# Patient Record
Sex: FEMALE | NOT HISPANIC OR LATINO | Employment: FULL TIME | URBAN - METROPOLITAN AREA
[De-identification: names, ages, dates, MRNs, and addresses within clinical notes are randomized per-mention and may not be internally consistent; named-entity substitution may affect disease eponyms.]

---

## 2018-12-19 ENCOUNTER — APPOINTMENT (OUTPATIENT)
Dept: CT IMAGING | Facility: HOSPITAL | Age: 43
End: 2018-12-19

## 2018-12-19 ENCOUNTER — HOSPITAL ENCOUNTER (EMERGENCY)
Facility: HOSPITAL | Age: 43
Discharge: HOME | End: 2018-12-20
Attending: EMERGENCY MEDICINE | Admitting: EMERGENCY MEDICINE

## 2018-12-19 DIAGNOSIS — S09.90XA MINOR HEAD INJURY, INITIAL ENCOUNTER: ICD-10-CM

## 2018-12-19 DIAGNOSIS — S01.81XA LACERATION OF FOREHEAD, INITIAL ENCOUNTER: Primary | ICD-10-CM

## 2018-12-19 DIAGNOSIS — S01.01XA LACERATION OF SCALP, INITIAL ENCOUNTER: ICD-10-CM

## 2018-12-19 DIAGNOSIS — E87.6 HYPOKALEMIA: ICD-10-CM

## 2018-12-19 LAB
ANION GAP SERPL CALCULATED.3IONS-SCNC: 7 MMOL/L (ref 3–11)
B-HCG UR QL: NEGATIVE
BASOPHILS # BLD AUTO: 0.03 10*3/MM3 (ref 0–0.2)
BASOPHILS NFR BLD AUTO: 0.2 % (ref 0–1)
BUN BLD-MCNC: 9 MG/DL (ref 9–23)
BUN/CREAT SERPL: 11.8 (ref 7–25)
CALCIUM SPEC-SCNC: 9.1 MG/DL (ref 8.7–10.4)
CHLORIDE SERPL-SCNC: 106 MMOL/L (ref 99–109)
CO2 SERPL-SCNC: 26 MMOL/L (ref 20–31)
CREAT BLD-MCNC: 0.76 MG/DL (ref 0.6–1.3)
DEPRECATED RDW RBC AUTO: 41.9 FL (ref 37–54)
EOSINOPHIL # BLD AUTO: 0.33 10*3/MM3 (ref 0–0.3)
EOSINOPHIL NFR BLD AUTO: 2.5 % (ref 0–3)
ERYTHROCYTE [DISTWIDTH] IN BLOOD BY AUTOMATED COUNT: 12.5 % (ref 11.3–14.5)
GFR SERPL CREATININE-BSD FRML MDRD: 101 ML/MIN/1.73
GFR SERPL CREATININE-BSD FRML MDRD: 83 ML/MIN/1.73
GLUCOSE BLD-MCNC: 114 MG/DL (ref 70–100)
HCT VFR BLD AUTO: 38.8 % (ref 34.5–44)
HGB BLD-MCNC: 13.2 G/DL (ref 11.5–15.5)
IMM GRANULOCYTES # BLD: 0.05 10*3/MM3 (ref 0–0.03)
IMM GRANULOCYTES NFR BLD: 0.4 % (ref 0–0.6)
LYMPHOCYTES # BLD AUTO: 2.94 10*3/MM3 (ref 0.6–4.8)
LYMPHOCYTES NFR BLD AUTO: 22.4 % (ref 24–44)
MCH RBC QN AUTO: 31.1 PG (ref 27–31)
MCHC RBC AUTO-ENTMCNC: 34 G/DL (ref 32–36)
MCV RBC AUTO: 91.5 FL (ref 80–99)
MONOCYTES # BLD AUTO: 0.98 10*3/MM3 (ref 0–1)
MONOCYTES NFR BLD AUTO: 7.5 % (ref 0–12)
NEUTROPHILS # BLD AUTO: 8.79 10*3/MM3 (ref 1.5–8.3)
NEUTROPHILS NFR BLD AUTO: 67 % (ref 41–71)
PLATELET # BLD AUTO: 271 10*3/MM3 (ref 150–450)
PMV BLD AUTO: 11.5 FL (ref 6–12)
POTASSIUM BLD-SCNC: 3.4 MMOL/L (ref 3.5–5.5)
RBC # BLD AUTO: 4.24 10*6/MM3 (ref 3.89–5.14)
SODIUM BLD-SCNC: 139 MMOL/L (ref 132–146)
WBC NRBC COR # BLD: 13.12 10*3/MM3 (ref 3.5–10.8)

## 2018-12-19 PROCEDURE — 99284 EMERGENCY DEPT VISIT MOD MDM: CPT

## 2018-12-19 PROCEDURE — 93005 ELECTROCARDIOGRAM TRACING: CPT | Performed by: EMERGENCY MEDICINE

## 2018-12-19 PROCEDURE — 85025 COMPLETE CBC W/AUTO DIFF WBC: CPT | Performed by: EMERGENCY MEDICINE

## 2018-12-19 PROCEDURE — 81025 URINE PREGNANCY TEST: CPT | Performed by: EMERGENCY MEDICINE

## 2018-12-19 PROCEDURE — 80048 BASIC METABOLIC PNL TOTAL CA: CPT | Performed by: EMERGENCY MEDICINE

## 2018-12-19 PROCEDURE — 70450 CT HEAD/BRAIN W/O DYE: CPT

## 2018-12-19 RX ORDER — LIDOCAINE HYDROCHLORIDE AND EPINEPHRINE 10; 10 MG/ML; UG/ML
INJECTION, SOLUTION INFILTRATION; PERINEURAL
Status: COMPLETED
Start: 2018-12-19 | End: 2018-12-20

## 2018-12-19 RX ORDER — POTASSIUM CHLORIDE 750 MG/1
20 CAPSULE, EXTENDED RELEASE ORAL ONCE
Status: COMPLETED | OUTPATIENT
Start: 2018-12-19 | End: 2018-12-20

## 2018-12-19 RX ORDER — IBUPROFEN 200 MG
200 TABLET ORAL EVERY 6 HOURS PRN
COMMUNITY

## 2018-12-20 VITALS
RESPIRATION RATE: 16 BRPM | DIASTOLIC BLOOD PRESSURE: 74 MMHG | TEMPERATURE: 98.5 F | HEART RATE: 90 BPM | BODY MASS INDEX: 24.75 KG/M2 | SYSTOLIC BLOOD PRESSURE: 110 MMHG | HEIGHT: 64 IN | OXYGEN SATURATION: 98 % | WEIGHT: 145 LBS

## 2018-12-20 PROCEDURE — 25010000002 TDAP 5-2.5-18.5 LF-MCG/0.5 SUSPENSION: Performed by: EMERGENCY MEDICINE

## 2018-12-20 PROCEDURE — 90715 TDAP VACCINE 7 YRS/> IM: CPT | Performed by: EMERGENCY MEDICINE

## 2018-12-20 PROCEDURE — 90471 IMMUNIZATION ADMIN: CPT | Performed by: EMERGENCY MEDICINE

## 2018-12-20 RX ORDER — LIDOCAINE HYDROCHLORIDE AND EPINEPHRINE 10; 10 MG/ML; UG/ML
10 INJECTION, SOLUTION INFILTRATION; PERINEURAL ONCE
Status: COMPLETED | OUTPATIENT
Start: 2018-12-20 | End: 2018-12-20

## 2018-12-20 RX ADMIN — POTASSIUM CHLORIDE 20 MEQ: 750 CAPSULE, EXTENDED RELEASE ORAL at 00:08

## 2018-12-20 RX ADMIN — TETANUS TOXOID, REDUCED DIPHTHERIA TOXOID AND ACELLULAR PERTUSSIS VACCINE, ADSORBED 0.5 ML: 5; 2.5; 8; 8; 2.5 SUSPENSION INTRAMUSCULAR at 00:06

## 2018-12-20 RX ADMIN — LIDOCAINE HYDROCHLORIDE AND EPINEPHRINE 5 ML: 10; 10 INJECTION, SOLUTION INFILTRATION; PERINEURAL at 00:05

## 2018-12-20 RX ADMIN — LIDOCAINE HYDROCHLORIDE,EPINEPHRINE BITARTRATE 5 ML: 10; .01 INJECTION, SOLUTION INFILTRATION; PERINEURAL at 00:05

## 2018-12-20 NOTE — ED PROVIDER NOTES
Subjective   43-year-old female presents for evaluation of head injury.  She states that approximately 30 minutes ago she was in her kitchen when she got dizzy, tripped, fell, and hit her head on the corner of a counter.  No LOC.  She is not anticoagulated.  Her only complaint at this time is a mild headache as well as a laceration to her forehead and scalp.  Unknown tetanus status.  No neck pain.        History provided by:  Patient  Fall   Mechanism of injury: fall    Injury location:  Head/neck  Head/neck injury location:  Head  Incident location:  Home  Time since incident:  30 minutes  Arrived directly from scene: yes    Fall:     Fall occurred:  Walking and tripped    Impact surface:  Hard floor    Point of impact:  Head    Entrapped after fall: no    Suspicion of alcohol use: no    Suspicion of drug use: no    Tetanus status:  Out of date  Prior to arrival data:     Patient ambulatory at scene: yes      Blood loss:  Minimal    Responsiveness at scene:  Alert    Orientation at scene:  Person, place, situation and time    Loss of consciousness: no      Amnesic to event: no    Associated symptoms: headaches    Associated symptoms: no loss of consciousness and no neck pain        Review of Systems   Musculoskeletal: Negative for neck pain.   Neurological: Positive for dizziness and headaches. Negative for loss of consciousness.   All other systems reviewed and are negative.      History reviewed. No pertinent past medical history.    No Known Allergies    Past Surgical History:   Procedure Laterality Date   •  SECTION      X 3       History reviewed. No pertinent family history.    Social History     Socioeconomic History   • Marital status:      Spouse name: Not on file   • Number of children: Not on file   • Years of education: Not on file   • Highest education level: Not on file   Tobacco Use   • Smoking status: Never Smoker   • Smokeless tobacco: Never Used   Substance and Sexual Activity   •  Alcohol use: No     Frequency: Never   • Drug use: No   • Sexual activity: Defer         Objective   Physical Exam   Constitutional: She is oriented to person, place, and time. She appears well-developed and well-nourished. No distress.   Well-appearing female in no acute distress   HENT:   Head: Normocephalic and atraumatic.   Mouth/Throat: Oropharynx is clear and moist.   Eyes: EOM are normal. Pupils are equal, round, and reactive to light.   Neck: Normal range of motion.   No midline cervical spine tenderness to palpation   Cardiovascular: Normal rate, regular rhythm and normal heart sounds. Exam reveals no gallop and no friction rub.   No murmur heard.  Pulmonary/Chest: Effort normal and breath sounds normal. No respiratory distress. She has no wheezes. She has no rales.   Musculoskeletal: Normal range of motion.   Neurological: She is alert and oriented to person, place, and time. No cranial nerve deficit or sensory deficit. Coordination normal.   Normal gait, 5 out of 5 strength in all fours, neurovascularly intact distally in all fours with bounding distal pulses and normal sensation   Skin: She is not diaphoretic.   Approximately 3x1 cm V-shaped laceration noted to superior and forehead extending into scalp/hairline, no active bleeding   Psychiatric: She has a normal mood and affect. Judgment and thought content normal.   Nursing note and vitals reviewed.      Laceration Repair  Date/Time: 12/19/2018 11:49 PM  Performed by: Ben Rodney MD  Authorized by: Ben Rodney MD     Consent:     Consent obtained:  Written    Consent given by:  Patient    Risks discussed:  Infection and pain  Anesthesia (see MAR for exact dosages):     Anesthesia method:  Local infiltration    Local anesthetic:  Lidocaine 1% WITH epi  Laceration details:     Location:  Scalp    Scalp location:  Frontal    Wound length (cm): 3 by 1 cm v shaped laceration.  Repair type:     Repair type:  Simple  Pre-procedure details:      Preparation:  Patient was prepped and draped in usual sterile fashion and imaging obtained to evaluate for foreign bodies  Treatment:     Area cleansed with:  Saline    Amount of cleaning:  Standard  Skin repair:     Repair method:  Sutures    Suture size:  6-0 (and 4-0)    Suture material:  Nylon    Suture technique:  Simple interrupted    Number of sutures: 6 of 6-0 and 3 of 4-0.  Approximation:     Approximation:  Close  Post-procedure details:     Dressing:  Open (no dressing)    Patient tolerance of procedure:  Tolerated well, no immediate complications               ED Course  ED Course as of Dec 20 0228   Wed Dec 19, 2018   2249 43-year-old female presents for evaluation following head injury.  Approximately 30 minutes ago, the patient was in her kitchen when she got dizzy, tripped, fell, and hit her head on a corner.  On arrival to the ED, patient well-appearing.  No LOC.  NEXUS negative.  No anticoagulants.  Currently, the patient's only complaint is mild headache in addition to her laceration.  Tetanus updated.  LET applied.  No neurological deficits noted.  We will obtain labs, EKG, and neuro imaging and we will reassess following initial interventions.  [DD]   2336 EKG revealed normal sinus rhythm with heart rate of 91 and no ST segments suggestive of or concerning for ischemia.  [DD]   2343 Labs remarkable only for mild hypokalemia.  Potassium replaced orally in the ED.  [DD]   u Dec 20, 2018   0012 After copious irrigation, the patient's wounds were repaired without complication.  Clean wound--no antibiotics indicated.  She will follow-up in the emergency department in 5 days for suture removal.  Agreeable with plan and given appropriate return precautions.  [DD]   0031 CT Head negative.  [DD]      ED Course User Index  [DD] Ben Rodney MD     Recent Results (from the past 24 hour(s))   Basic Metabolic Panel    Collection Time: 12/19/18 11:07 PM   Result Value Ref Range    Glucose 114  (H) 70 - 100 mg/dL    BUN 9 9 - 23 mg/dL    Creatinine 0.76 0.60 - 1.30 mg/dL    Sodium 139 132 - 146 mmol/L    Potassium 3.4 (L) 3.5 - 5.5 mmol/L    Chloride 106 99 - 109 mmol/L    CO2 26.0 20.0 - 31.0 mmol/L    Calcium 9.1 8.7 - 10.4 mg/dL    eGFR  African Amer 101 >60 mL/min/1.73    eGFR Non African Amer 83 >60 mL/min/1.73    BUN/Creatinine Ratio 11.8 7.0 - 25.0    Anion Gap 7.0 3.0 - 11.0 mmol/L   CBC Auto Differential    Collection Time: 12/19/18 11:07 PM   Result Value Ref Range    WBC 13.12 (H) 3.50 - 10.80 10*3/mm3    RBC 4.24 3.89 - 5.14 10*6/mm3    Hemoglobin 13.2 11.5 - 15.5 g/dL    Hematocrit 38.8 34.5 - 44.0 %    MCV 91.5 80.0 - 99.0 fL    MCH 31.1 (H) 27.0 - 31.0 pg    MCHC 34.0 32.0 - 36.0 g/dL    RDW 12.5 11.3 - 14.5 %    RDW-SD 41.9 37.0 - 54.0 fl    MPV 11.5 6.0 - 12.0 fL    Platelets 271 150 - 450 10*3/mm3    Neutrophil % 67.0 41.0 - 71.0 %    Lymphocyte % 22.4 (L) 24.0 - 44.0 %    Monocyte % 7.5 0.0 - 12.0 %    Eosinophil % 2.5 0.0 - 3.0 %    Basophil % 0.2 0.0 - 1.0 %    Immature Grans % 0.4 0.0 - 0.6 %    Neutrophils, Absolute 8.79 (H) 1.50 - 8.30 10*3/mm3    Lymphocytes, Absolute 2.94 0.60 - 4.80 10*3/mm3    Monocytes, Absolute 0.98 0.00 - 1.00 10*3/mm3    Eosinophils, Absolute 0.33 (H) 0.00 - 0.30 10*3/mm3    Basophils, Absolute 0.03 0.00 - 0.20 10*3/mm3    Immature Grans, Absolute 0.05 (H) 0.00 - 0.03 10*3/mm3   Pregnancy, Urine - Urine, Clean Catch    Collection Time: 12/19/18 11:08 PM   Result Value Ref Range    HCG, Urine QL Negative Negative     Note: In addition to lab results from this visit, the labs listed above may include labs taken at another facility or during a different encounter within the last 24 hours. Please correlate lab times with ED admission and discharge times for further clarification of the services performed during this visit.    CT Head Without Contrast    (Results Pending)     Vitals:    12/19/18 2238   BP: 114/79   BP Location: Left arm   Patient Position:  "Sitting   Pulse: 106   Resp: 16   Temp: 98.5 °F (36.9 °C)   TempSrc: Oral   SpO2: 98%   Weight: 65.8 kg (145 lb)   Height: 162.6 cm (64\")     Medications   Tdap (BOOSTRIX) injection 0.5 mL (0.5 mL Intramuscular Given 12/20/18 0006)   potassium chloride (MICRO-K) CR capsule 20 mEq (20 mEq Oral Given 12/20/18 0008)   lidocaine-EPINEPHrine (XYLOCAINE W/EPI) 1 %-1:292782 injection 10 mL (5 mL Injection Given 12/20/18 0005)     ECG/EMG Results (last 24 hours)     ** No results found for the last 24 hours. **                  Recent Results (from the past 24 hour(s))   Basic Metabolic Panel    Collection Time: 12/19/18 11:07 PM   Result Value Ref Range    Glucose 114 (H) 70 - 100 mg/dL    BUN 9 9 - 23 mg/dL    Creatinine 0.76 0.60 - 1.30 mg/dL    Sodium 139 132 - 146 mmol/L    Potassium 3.4 (L) 3.5 - 5.5 mmol/L    Chloride 106 99 - 109 mmol/L    CO2 26.0 20.0 - 31.0 mmol/L    Calcium 9.1 8.7 - 10.4 mg/dL    eGFR  African Amer 101 >60 mL/min/1.73    eGFR Non African Amer 83 >60 mL/min/1.73    BUN/Creatinine Ratio 11.8 7.0 - 25.0    Anion Gap 7.0 3.0 - 11.0 mmol/L   CBC Auto Differential    Collection Time: 12/19/18 11:07 PM   Result Value Ref Range    WBC 13.12 (H) 3.50 - 10.80 10*3/mm3    RBC 4.24 3.89 - 5.14 10*6/mm3    Hemoglobin 13.2 11.5 - 15.5 g/dL    Hematocrit 38.8 34.5 - 44.0 %    MCV 91.5 80.0 - 99.0 fL    MCH 31.1 (H) 27.0 - 31.0 pg    MCHC 34.0 32.0 - 36.0 g/dL    RDW 12.5 11.3 - 14.5 %    RDW-SD 41.9 37.0 - 54.0 fl    MPV 11.5 6.0 - 12.0 fL    Platelets 271 150 - 450 10*3/mm3    Neutrophil % 67.0 41.0 - 71.0 %    Lymphocyte % 22.4 (L) 24.0 - 44.0 %    Monocyte % 7.5 0.0 - 12.0 %    Eosinophil % 2.5 0.0 - 3.0 %    Basophil % 0.2 0.0 - 1.0 %    Immature Grans % 0.4 0.0 - 0.6 %    Neutrophils, Absolute 8.79 (H) 1.50 - 8.30 10*3/mm3    Lymphocytes, Absolute 2.94 0.60 - 4.80 10*3/mm3    Monocytes, Absolute 0.98 0.00 - 1.00 10*3/mm3    Eosinophils, Absolute 0.33 (H) 0.00 - 0.30 10*3/mm3    Basophils, Absolute 0.03 " "0.00 - 0.20 10*3/mm3    Immature Grans, Absolute 0.05 (H) 0.00 - 0.03 10*3/mm3   Pregnancy, Urine - Urine, Clean Catch    Collection Time: 12/19/18 11:08 PM   Result Value Ref Range    HCG, Urine QL Negative Negative     Note: In addition to lab results from this visit, the labs listed above may include labs taken at another facility or during a different encounter within the last 24 hours. Please correlate lab times with ED admission and discharge times for further clarification of the services performed during this visit.    CT Head Without Contrast   Final Result   No evidence of acute intracranial abnormality.       THIS DOCUMENT HAS BEEN ELECTRONICALLY SIGNED BY ANGELICA KAMINSKI JR. MD        Vitals:    12/19/18 2238 12/20/18 0036   BP: 114/79 110/74   BP Location: Left arm    Patient Position: Sitting    Pulse: 106 90   Resp: 16 16   Temp: 98.5 °F (36.9 °C)    TempSrc: Oral    SpO2: 98% 98%   Weight: 65.8 kg (145 lb)    Height: 162.6 cm (64\")      Medications   Tdap (BOOSTRIX) injection 0.5 mL (0.5 mL Intramuscular Given 12/20/18 0006)   potassium chloride (MICRO-K) CR capsule 20 mEq (20 mEq Oral Given 12/20/18 0008)   lidocaine-EPINEPHrine (XYLOCAINE W/EPI) 1 %-1:739975 injection 10 mL (5 mL Injection Given 12/20/18 0005)     ECG/EMG Results (last 24 hours)     Procedure Component Value Units Date/Time    ECG 12 Lead [[id number]] Collected:  12/19/18 2319     Updated:  12/19/18 2321              MDM    Final diagnoses:   Laceration of forehead, initial encounter   Laceration of scalp, initial encounter   Minor head injury, initial encounter   Hypokalemia       Documentation assistance provided by darvin Dee.  Information recorded by the darvin was done at my direction and has been verified and validated by me.     Gigi Dee  12/19/18 2244       , Gigi  12/19/18 2251       , Gigi  12/20/18 0004       , Gigi  12/20/18 0016       Ben Rodney MD  12/20/18 " 2443

## 2018-12-24 ENCOUNTER — HOSPITAL ENCOUNTER (EMERGENCY)
Facility: HOSPITAL | Age: 43
Discharge: HOME | End: 2018-12-24

## 2018-12-24 VITALS
HEIGHT: 64 IN | SYSTOLIC BLOOD PRESSURE: 129 MMHG | RESPIRATION RATE: 16 BRPM | BODY MASS INDEX: 24.75 KG/M2 | DIASTOLIC BLOOD PRESSURE: 82 MMHG | TEMPERATURE: 98 F | HEART RATE: 80 BPM | OXYGEN SATURATION: 98 % | WEIGHT: 145 LBS

## 2018-12-24 PROCEDURE — 99201: CPT
